# Patient Record
Sex: FEMALE | Race: WHITE | NOT HISPANIC OR LATINO | URBAN - METROPOLITAN AREA
[De-identification: names, ages, dates, MRNs, and addresses within clinical notes are randomized per-mention and may not be internally consistent; named-entity substitution may affect disease eponyms.]

---

## 2023-05-24 ENCOUNTER — OFFICE VISIT (OUTPATIENT)
Dept: PEDIATRICS | Facility: CLINIC | Age: 23
End: 2023-05-24
Payer: COMMERCIAL

## 2023-05-24 VITALS
HEIGHT: 61 IN | BODY MASS INDEX: 29.38 KG/M2 | SYSTOLIC BLOOD PRESSURE: 107 MMHG | WEIGHT: 155.6 LBS | DIASTOLIC BLOOD PRESSURE: 76 MMHG | HEART RATE: 78 BPM

## 2023-05-24 DIAGNOSIS — Z00.00 HEALTH CARE MAINTENANCE: Primary | ICD-10-CM

## 2023-05-24 PROBLEM — L70.0 ACNE VULGARIS: Status: ACTIVE | Noted: 2023-05-24

## 2023-05-24 PROBLEM — M79.604 PAIN IN BOTH LOWER EXTREMITIES: Status: ACTIVE | Noted: 2023-05-24

## 2023-05-24 PROBLEM — K59.00 CONSTIPATION: Status: ACTIVE | Noted: 2023-05-24

## 2023-05-24 PROBLEM — J30.9 ALLERGIC RHINITIS: Status: ACTIVE | Noted: 2023-05-24

## 2023-05-24 PROBLEM — M25.561 ARTHRALGIA OF RIGHT KNEE: Status: RESOLVED | Noted: 2023-05-24 | Resolved: 2023-05-24

## 2023-05-24 PROBLEM — J34.89 NASAL OBSTRUCTION: Status: ACTIVE | Noted: 2023-05-24

## 2023-05-24 PROBLEM — R51.9 FRONTAL HEADACHE: Status: ACTIVE | Noted: 2023-05-24

## 2023-05-24 PROBLEM — R10.9 ABDOMINAL PAIN, ACUTE: Status: ACTIVE | Noted: 2023-05-24

## 2023-05-24 PROBLEM — E61.1 LOW IRON: Status: ACTIVE | Noted: 2023-05-24

## 2023-05-24 PROBLEM — R10.9 ABDOMINAL CRAMPING: Status: ACTIVE | Noted: 2023-05-24

## 2023-05-24 PROBLEM — H52.203 ASTIGMATISM, BILATERAL: Status: ACTIVE | Noted: 2023-05-24

## 2023-05-24 PROBLEM — J30.9 ALLERGIC RHINITIS: Status: RESOLVED | Noted: 2023-05-24 | Resolved: 2023-05-24

## 2023-05-24 PROBLEM — M25.561 ARTHRALGIA OF RIGHT KNEE: Status: ACTIVE | Noted: 2023-05-24

## 2023-05-24 PROBLEM — R53.83 FATIGUE: Status: ACTIVE | Noted: 2023-05-24

## 2023-05-24 PROBLEM — M79.605 PAIN IN BOTH LOWER EXTREMITIES: Status: ACTIVE | Noted: 2023-05-24

## 2023-05-24 PROBLEM — L70.0 ACNE VULGARIS: Status: RESOLVED | Noted: 2023-05-24 | Resolved: 2023-05-24

## 2023-05-24 PROBLEM — M54.9 BACK PAIN: Status: ACTIVE | Noted: 2023-05-24

## 2023-05-24 PROBLEM — A04.72 C. DIFFICILE DIARRHEA: Status: ACTIVE | Noted: 2023-05-24

## 2023-05-24 PROBLEM — B96.81 H. PYLORI DUODENITIS: Status: ACTIVE | Noted: 2023-05-24

## 2023-05-24 PROBLEM — R10.9 ABDOMINAL PAIN, ACUTE: Status: RESOLVED | Noted: 2023-05-24 | Resolved: 2023-05-24

## 2023-05-24 PROBLEM — K29.80 H. PYLORI DUODENITIS: Status: ACTIVE | Noted: 2023-05-24

## 2023-05-24 PROBLEM — K21.9 ESOPHAGEAL REFLUX: Status: RESOLVED | Noted: 2023-05-24 | Resolved: 2023-05-24

## 2023-05-24 PROBLEM — A04.72 C. DIFFICILE DIARRHEA: Status: RESOLVED | Noted: 2023-05-24 | Resolved: 2023-05-24

## 2023-05-24 PROBLEM — M25.50 ARTHRALGIA: Status: ACTIVE | Noted: 2023-05-24

## 2023-05-24 PROBLEM — K58.9 IBS (IRRITABLE BOWEL SYNDROME): Status: ACTIVE | Noted: 2023-05-24

## 2023-05-24 PROBLEM — K21.9 ESOPHAGEAL REFLUX: Status: ACTIVE | Noted: 2023-05-24

## 2023-05-24 PROBLEM — H10.13 ALLERGIC CONJUNCTIVITIS OF BOTH EYES: Status: ACTIVE | Noted: 2023-05-24

## 2023-05-24 PROBLEM — M25.50 ARTHRALGIA: Status: RESOLVED | Noted: 2023-05-24 | Resolved: 2023-05-24

## 2023-05-24 LAB
NON-UH HIE A/G RATIO: 1.2
NON-UH HIE ALB: 3.8 G/DL (ref 3.4–5)
NON-UH HIE ALK PHOS: 61 UNIT/L (ref 46–116)
NON-UH HIE BILIRUBIN, TOTAL: 0.6 MG/DL (ref 0.2–1)
NON-UH HIE BUN/CREAT RATIO: 17.1
NON-UH HIE BUN: 12 MG/DL (ref 9–23)
NON-UH HIE CALCIUM: 9.3 MG/DL (ref 8.7–10.4)
NON-UH HIE CALCULATED LDL CHOLESTEROL: 88 MG/DL (ref 60–130)
NON-UH HIE CALCULATED OSMOLALITY: 278 MOSM/KG (ref 275–295)
NON-UH HIE CHLORIDE: 108 MMOL/L (ref 98–107)
NON-UH HIE CHOLESTEROL: 177 MG/DL (ref 100–200)
NON-UH HIE CO2, VENOUS: 27 MMOL/L (ref 20–31)
NON-UH HIE CREATININE: 0.7 MG/DL (ref 0.5–0.8)
NON-UH HIE GFR AA: >60
NON-UH HIE GLOBULIN: 3.3 G/DL
NON-UH HIE GLOMERULAR FILTRATION RATE: >60 ML/MIN/1.73M?
NON-UH HIE GLUCOSE: 85 MG/DL (ref 74–106)
NON-UH HIE GOT: 20 UNIT/L (ref 15–37)
NON-UH HIE GPT: 10 UNIT/L (ref 10–49)
NON-UH HIE HCT: 38.6 % (ref 36–46)
NON-UH HIE HDL CHOLESTEROL: 79 MG/DL (ref 40–60)
NON-UH HIE HEPATITIS B SURFACE ANTIBODY: REACTIVE
NON-UH HIE HGB: 12.7 G/DL (ref 12–16)
NON-UH HIE INSTR WBC ND: 3.5
NON-UH HIE K: 4.1 MMOL/L (ref 3.5–5.1)
NON-UH HIE MCH: 27.6 PG (ref 27–34)
NON-UH HIE MCHC: 33 G/DL (ref 32–37)
NON-UH HIE MCV: 83.8 FL (ref 80–100)
NON-UH HIE MPV: 9.7 FL (ref 7.4–10.4)
NON-UH HIE NA: 140 MMOL/L (ref 135–145)
NON-UH HIE PLATELET: 223 X10 (ref 150–450)
NON-UH HIE RBC: 4.6 X10 (ref 4.2–5.4)
NON-UH HIE RDW: 14.3 % (ref 11.5–14.5)
NON-UH HIE TOTAL CHOL/HDL CHOL RATIO: 2.2
NON-UH HIE TOTAL PROTEIN: 7.1 G/DL (ref 5.7–8.2)
NON-UH HIE TRIGLYCERIDES: 49 MG/DL (ref 30–150)
NON-UH HIE TSH: 1.53 UIU/ML (ref 0.55–4.78)
NON-UH HIE WBC: 3.5 X10 (ref 4.5–11)

## 2023-05-24 PROCEDURE — 99395 PREV VISIT EST AGE 18-39: CPT | Performed by: PEDIATRICS

## 2023-05-24 PROCEDURE — 1036F TOBACCO NON-USER: CPT | Performed by: PEDIATRICS

## 2023-05-24 PROCEDURE — 3008F BODY MASS INDEX DOCD: CPT | Performed by: PEDIATRICS

## 2023-05-24 NOTE — PATIENT INSTRUCTIONS
It looks like your school is only requesting titers for hep B  which we ordered today    We are also ordering a varicella titer bc we can only find one documented dose     We are doing routine lab works as well    We will decide based on titers what vaccines are needed along with Hep A #2

## 2023-05-24 NOTE — PROGRESS NOTES
"   Suman Gerber is a 22 y.o. female who presents for Well Child (22 year [facility]/Here by herself).      HPI  starting pharmacy school   end of Auguts  will live at home  NEOMED    Needs copies of vaccines    Titers for Hep B  and will do Varicella bc only one documented dose   Requesting labs  still some feeling dizzy and sometimes takes Bps and systolic is 90s       Stomach better     all groups     water  good   yogurt    Sleep   good schedule    Headaches have been better     Seasonal   allergies    Not in relationship   No constipation   Periods regular     Working  this summer   [employer]   pharmacy  dept         Living   home                   Objective   /76 (BP Location: Left arm, Patient Position: Sitting)   Pulse 78   Ht 1.549 m (5' 1\")   Wt 70.6 kg (155 lb 9.6 oz) Comment: 155.6 lbs  BMI 29.40 kg/m²       Physical Exam  General: Well-developed, well-nourished, alert and oriented, no acute distress  Eyes: Normal sclera, ERASTO, EOMI. Red reflex intact, light reflex symmetric.   ENT: Moist mucous membranes, normal throat, no nasal discharge. TMs are normal.  Cardiac:  Normal S1/S2, regular rhythm. Capillary refill less than 2 seconds. No clinically significant murmurs.    Pulmonary: Clear to auscultation bilaterally, no work of breathing.  GI: Soft nontender nondistended abdomen, no HSM, no masses.    Skin: No specific or unusual rashes  Neuro: Symmetric face, no ataxia, grossly normal strength and normal reflexes.  Lymph and Neck: No lymphadenopathy, no visible thyroid swelling.  Musculoskeletal:   Full  range of motion, normal strength and tone, no significant scoliosis,  no joint swelling or bone tenderness  Psych:  normal mood and affect  :  not examined       Assessment/Plan   Problem List Items Addressed This Visit    None  Visit Diagnoses       Health care maintenance    -  Primary    Relevant Orders    Varicella Zoster Antibody, IgG    Hepatitis B Surface Antibody    CBC    Comprehensive Metabolic " Panel    Vitamin D 1,25 Dihydroxy    Lipid panel    TSH with reflex to Free T4 if abnormal    BMI 29.0-29.9,adult                Patient Instructions     It looks like your school is only requesting titers for hep B  which we ordered today    We are also ordering a varicella titer bc we can only find one documented dose     We are doing routine lab works as well    We will decide based on titers what vaccines are needed along with Hep A #2

## 2023-05-25 LAB — NON-UH HIE V. ZOSTER ANTIBODY: NORMAL

## 2023-05-28 LAB — NON-UH HIE MISC SENDOUT: NORMAL

## 2023-08-02 ENCOUNTER — APPOINTMENT (OUTPATIENT)
Dept: PEDIATRICS | Facility: CLINIC | Age: 23
End: 2023-08-02
Payer: COMMERCIAL

## 2023-08-08 ENCOUNTER — CLINICAL SUPPORT (OUTPATIENT)
Dept: PEDIATRICS | Facility: CLINIC | Age: 23
End: 2023-08-08
Payer: COMMERCIAL

## 2023-08-08 DIAGNOSIS — Z11.1 PPD SCREENING TEST: Primary | ICD-10-CM

## 2023-08-08 PROCEDURE — 86580 TB INTRADERMAL TEST: CPT | Performed by: PEDIATRICS

## 2023-08-10 ENCOUNTER — OFFICE VISIT (OUTPATIENT)
Dept: PEDIATRICS | Facility: CLINIC | Age: 23
End: 2023-08-10
Payer: COMMERCIAL

## 2023-08-10 DIAGNOSIS — Z11.1 PPD SCREENING TEST: ICD-10-CM

## 2023-08-10 DIAGNOSIS — Z00.129 ENCOUNTER FOR ROUTINE CHILD HEALTH EXAMINATION WITHOUT ABNORMAL FINDINGS: Primary | ICD-10-CM

## 2023-08-10 PROCEDURE — 99212 OFFICE O/P EST SF 10 MIN: CPT | Performed by: PEDIATRICS

## 2023-08-10 PROCEDURE — 1036F TOBACCO NON-USER: CPT | Performed by: PEDIATRICS

## 2023-08-10 PROCEDURE — 3008F BODY MASS INDEX DOCD: CPT | Performed by: PEDIATRICS

## 2023-08-10 NOTE — PROGRESS NOTES
TB    with  2 mm  induration      Negative read      In looking at her screenshots  she actually  needed a TB spot and the skin test is not acceptable so we gave an order today    We also gave her copies of her Varicella and Hep B tires which both demonstrate that the is immune/ reactive a nd needs no further immunization

## 2023-08-13 LAB
NON-UH HIE QUANTIFERON MITOGEN MINUS NIL: >10 IU/ML
NON-UH HIE QUANTIFERON NIL: 0.05 IU/ML
NON-UH HIE QUANTIFERON PLUS TB1 MINUS NIL: 0 IU/ML (ref 0–0.34)
NON-UH HIE QUANTIFERON PLUS TB2 MINUS NIL: 0 IU/ML (ref 0–0.34)
NON-UH HIE QUANTIFERON TB GOLD PLUS: NEGATIVE

## 2023-08-16 ENCOUNTER — TELEPHONE (OUTPATIENT)
Dept: PEDIATRICS | Facility: CLINIC | Age: 23
End: 2023-08-16
Payer: COMMERCIAL

## 2023-08-16 NOTE — TELEPHONE ENCOUNTER
----- Message from DERIK Steiner sent at 8/16/2023  8:37 AM EDT -----  Let her know the TB blood test was normal. Hopefully gina can see it in my chart or we can email it to her   ----- Message -----  From: Lewis Lott - Lab Results In  Sent: 8/13/2023   5:01 PM EDT  To: DERIK Steiner

## 2023-11-22 ENCOUNTER — OFFICE VISIT (OUTPATIENT)
Dept: URGENT CARE | Facility: CLINIC | Age: 23
End: 2023-11-22
Payer: COMMERCIAL

## 2023-11-22 VITALS
HEART RATE: 109 BPM | SYSTOLIC BLOOD PRESSURE: 107 MMHG | RESPIRATION RATE: 18 BRPM | TEMPERATURE: 97.9 F | WEIGHT: 152 LBS | OXYGEN SATURATION: 99 % | DIASTOLIC BLOOD PRESSURE: 73 MMHG | BODY MASS INDEX: 28.72 KG/M2

## 2023-11-22 DIAGNOSIS — H66.002 ACUTE SUPPURATIVE OTITIS MEDIA OF LEFT EAR WITHOUT SPONTANEOUS RUPTURE OF TYMPANIC MEMBRANE, RECURRENCE NOT SPECIFIED: Primary | ICD-10-CM

## 2023-11-22 PROCEDURE — 99203 OFFICE O/P NEW LOW 30 MIN: CPT | Performed by: FAMILY MEDICINE

## 2023-11-22 PROCEDURE — 1036F TOBACCO NON-USER: CPT | Performed by: FAMILY MEDICINE

## 2023-11-22 PROCEDURE — 3008F BODY MASS INDEX DOCD: CPT | Performed by: FAMILY MEDICINE

## 2023-11-22 NOTE — PROGRESS NOTES
Complains of three weeks congestion facial pressure, green sputum and nasal drainage    Subjective   Suman Gerber is a 23 y.o. female who presents for evaluation of possible sinus infection. Symptoms include congestion and nasal congestion with no fever, chills, night sweats or weight loss. Onset of symptoms was 3 weeks ago, gradually worsening since that time. She is drinking plenty of fluids. Past history is significant for no history of pneumonia or bronchitis. Patient is a non-smoker.    Objective   /73   Pulse 109   Temp 36.6 °C (97.9 °F)   Resp 18   Wt 68.9 kg (152 lb)   SpO2 99%   BMI 28.72 kg/m²   Physical Exam  Alert and oriented no distress  Heent Purulent nasal drainage, ears normal, throat normal, nolymphadenopathy  Heart exam normal  Lung - CTA    Assessment/Plan   Acute bacterial sinusitis.    1. rest  2. Augmentin  3. Nasal saline rinses as needed for congestion.  4. Follow-up with PCP in 7 days if symptoms worsen or persist.    Augmetin 875mg   #20   1 po bid  called to her pharmacy

## 2024-01-03 ENCOUNTER — OFFICE VISIT (OUTPATIENT)
Dept: PRIMARY CARE | Facility: CLINIC | Age: 24
End: 2024-01-03
Payer: COMMERCIAL

## 2024-01-03 VITALS
DIASTOLIC BLOOD PRESSURE: 78 MMHG | HEART RATE: 89 BPM | HEIGHT: 61 IN | OXYGEN SATURATION: 98 % | WEIGHT: 171 LBS | SYSTOLIC BLOOD PRESSURE: 113 MMHG | BODY MASS INDEX: 32.28 KG/M2

## 2024-01-03 DIAGNOSIS — K12.0 APHTHOUS ULCER: ICD-10-CM

## 2024-01-03 DIAGNOSIS — R03.0 ELEVATED BP WITHOUT DIAGNOSIS OF HYPERTENSION: ICD-10-CM

## 2024-01-03 DIAGNOSIS — B37.9 CANDIDA INFECTION: ICD-10-CM

## 2024-01-03 DIAGNOSIS — R00.2 PALPITATIONS: Primary | ICD-10-CM

## 2024-01-03 PROCEDURE — 99213 OFFICE O/P EST LOW 20 MIN: CPT | Performed by: FAMILY MEDICINE

## 2024-01-03 PROCEDURE — 1036F TOBACCO NON-USER: CPT | Performed by: FAMILY MEDICINE

## 2024-01-03 PROCEDURE — 3008F BODY MASS INDEX DOCD: CPT | Performed by: FAMILY MEDICINE

## 2024-01-03 RX ORDER — FLUCONAZOLE 150 MG/1
150 TABLET ORAL ONCE
Qty: 1 TABLET | Refills: 0 | Status: SHIPPED | OUTPATIENT
Start: 2024-01-03 | End: 2024-01-03

## 2024-01-03 RX ORDER — LIDOCAINE HYDROCHLORIDE 20 MG/ML
1.25 SOLUTION OROPHARYNGEAL AS NEEDED
Qty: 20 ML | Refills: 0 | Status: SHIPPED | OUTPATIENT
Start: 2024-01-03

## 2024-01-03 RX ORDER — VALACYCLOVIR HYDROCHLORIDE 1 G/1
2000 TABLET, FILM COATED ORAL 2 TIMES DAILY
Qty: 4 TABLET | Refills: 0 | Status: SHIPPED | OUTPATIENT
Start: 2024-01-03

## 2024-01-03 ASSESSMENT — PATIENT HEALTH QUESTIONNAIRE - PHQ9
SUM OF ALL RESPONSES TO PHQ9 QUESTIONS 1 AND 2: 0
10. IF YOU CHECKED OFF ANY PROBLEMS, HOW DIFFICULT HAVE THESE PROBLEMS MADE IT FOR YOU TO DO YOUR WORK, TAKE CARE OF THINGS AT HOME, OR GET ALONG WITH OTHER PEOPLE: NOT DIFFICULT AT ALL
2. FEELING DOWN, DEPRESSED OR HOPELESS: NOT AT ALL
8. MOVING OR SPEAKING SO SLOWLY THAT OTHER PEOPLE COULD HAVE NOTICED. OR THE OPPOSITE, BEING SO FIGETY OR RESTLESS THAT YOU HAVE BEEN MOVING AROUND A LOT MORE THAN USUAL: NOT AT ALL
4. FEELING TIRED OR HAVING LITTLE ENERGY: SEVERAL DAYS
6. FEELING BAD ABOUT YOURSELF - OR THAT YOU ARE A FAILURE OR HAVE LET YOURSELF OR YOUR FAMILY DOWN: NOT AT ALL
SUM OF ALL RESPONSES TO PHQ QUESTIONS 1-9: 3
5. POOR APPETITE OR OVEREATING: NOT AT ALL
9. THOUGHTS THAT YOU WOULD BE BETTER OFF DEAD, OR OF HURTING YOURSELF: NOT AT ALL
1. LITTLE INTEREST OR PLEASURE IN DOING THINGS: NOT AT ALL
7. TROUBLE CONCENTRATING ON THINGS, SUCH AS READING THE NEWSPAPER OR WATCHING TELEVISION: SEVERAL DAYS
3. TROUBLE FALLING OR STAYING ASLEEP OR SLEEPING TOO MUCH: SEVERAL DAYS

## 2024-01-03 NOTE — PROGRESS NOTES
Subjective   Patient ID: Suman Gerber is a 23 y.o. female [id number] who presents for Establish Care (New patient, sore in mouth).    HPI   Patient is here for concern for aphthous ulcer for last few days.  Patient usually get Valtrex with improvement of symptoms.  Asking for the same.    Patient is noticing palpitation and chest tightness at night.  Noticing elevated blood pressure at night affecting her sleep.  Patient denies feeling anxious or nervous sad or depressed.  I went her routine blood work recently.  Would like to get 1.    Recently completed antibiotic. Noticing yeast infection      Immunization History   Administered Date(s) Administered    DTaP vaccine, pediatric  (INFANRIX) 01/05/2000, 03/07/2000, 05/30/2000, 05/04/2001, 03/11/2005    Flu vaccine (IIV4), preservative free *Check age/dose* 10/01/2019, 01/15/2021, 10/24/2022    HPV, Quadrivalent 08/26/2011, 01/13/2012, 05/18/2012    Hepatitis A vaccine, age 19 years and greater (HAVRIX) 01/15/2021    Hepatitis B vaccine, pediatric/adolescent (RECOMBIVAX, ENGERIX) 04/16/2019, 01/15/2021    Hib / Hep B 01/05/2000, 03/07/2000, 03/12/2001    Influenza Nasal, Unspecified 01/20/2012    Influenza, Unspecified 01/20/2012    Influenza, injectable, quadrivalent 04/11/2019, 10/28/2021    MMR vaccine, subcutaneous (MMR II) 01/12/2001, 03/11/2005, 04/16/2019    Meningococcal MCV4P 05/18/2012, 05/18/2016    Novel influenza-H1N1-09, preservative-free 12/17/2009    PPD Test 04/09/2019, 04/16/2019, 08/08/2023    Pfizer Purple Cap SARS-CoV-2 02/25/2021, 04/10/2021    Pneumococcal Conjugate PCV 7 01/12/2001, 03/12/2001, 08/16/2003    Poliovirus vaccine, subcutaneous (IPOL) 01/05/2000, 03/07/2000, 05/04/2001, 03/11/2005    Tdap vaccine, age 7 year and older (BOOSTRIX) 08/26/2011, 01/15/2021    Varicella vaccine, subcutaneous (VARIVAX) 01/12/2001       Past Medical History:   Diagnosis Date    Body mass index (BMI) pediatric, 85th percentile to less than 95th percentile for  "age 2019    BMI (body mass index), pediatric, 85% to less than 95% for age    Hypermetropia, unspecified eye 2019    Hyperopia    Nausea 2020    Nausea in adult    Personal history of other diseases of the digestive system 2020    History of gastritis    Personal history of other diseases of the respiratory system 2020    History of acute sinusitis    Personal history of other specified conditions 2020    History of nausea and vomiting    Personal history of other specified conditions 2019    History of fatigue    Personal history of other specified conditions 2020    History of diarrhea    Personal history of other specified conditions 2019    History of diarrhea     , unspecified weeks of gestation     Premature infant    Unspecified otitis externa, right ear 2017    Right otitis externa       Social History     Tobacco Use    Smoking status: Never    Smokeless tobacco: Never   Vaping Use    Vaping Use: Never used   Substance Use Topics    Alcohol use: Never    Drug use: Never       No family history on file.    Recent Surgeries in Family Medicine            No cases to display            Current Outpatient Medications   Medication Sig Dispense Refill    fluconazole (Diflucan) 150 mg tablet Take 1 tablet (150 mg) by mouth 1 time for 1 dose. 1 tablet 0     No current facility-administered medications for this visit.       Physical Exam  /78   Pulse 89   Ht 1.549 m (5' 1\")   Wt 77.6 kg (171 lb)   SpO2 98%   BMI 32.31 kg/m²     Allergies   Allergen Reactions    Sulfamethoxazole-Trimethoprim Unknown and Rash     muscle pain    Pt states itching and chest started to hurt     Other Reaction(s): Unknown, Unknown      Pt states itching and chest started to hurt       muscle pain       General Appearance:  Alert, cooperative, no distress,   Head:  Normocephalic, atraumatic   Eyes:  PERRL, conjunctiva/corneas clear, EOM's intact,    Lungs:  "  Clear to auscultation bilaterally, respirations unlabored   Heart:  Regular rate and rhythm, S1 and S2 normal, no murmur,    Abdomen:   Soft, non-tender, bowel sounds active all four quadrants,  no masses, no organomegaly   Extremities: Extremities normal, No edema   Neurologic: Normal        Assessment/Plan   Diagnoses and all orders for this visit:  Palpitations  -     Holter Monitor >48 Hours - 7 Days - No Charges; Future  -     Comprehensive Metabolic Panel; Future  -     TSH with reflex to Free T4 if abnormal; Future  -     Triiodothyronine, Free; Future  -     Magnesium; Future  -     Vitamin B12; Future  Elevated BP without diagnosis of hypertension  -     Holter Monitor >48 Hours - 7 Days - No Charges; Future  -     CBC and Auto Differential; Future  Aphthous ulcer  -     lidocaine (Xylocaine) 2 % solution; Take 1.25 mL by mouth if needed for mild pain (1 - 3).  -     valACYclovir (Valtrex) 1 gram tablet; Take 2 tablets (2,000 mg) by mouth 2 times a day.  Candida infection  -     fluconazole (Diflucan) 150 mg tablet; Take 1 tablet (150 mg) by mouth 1 time for 1 dose.

## 2024-01-04 ENCOUNTER — TELEPHONE (OUTPATIENT)
Dept: PRIMARY CARE | Facility: CLINIC | Age: 24
End: 2024-01-04
Payer: COMMERCIAL

## 2024-06-17 ENCOUNTER — APPOINTMENT (OUTPATIENT)
Dept: PRIMARY CARE | Facility: CLINIC | Age: 24
End: 2024-06-17
Payer: COMMERCIAL

## 2024-06-18 ENCOUNTER — APPOINTMENT (OUTPATIENT)
Dept: PRIMARY CARE | Facility: CLINIC | Age: 24
End: 2024-06-18
Payer: COMMERCIAL

## 2024-06-25 ENCOUNTER — OFFICE VISIT (OUTPATIENT)
Dept: PRIMARY CARE | Facility: CLINIC | Age: 24
End: 2024-06-25
Payer: COMMERCIAL

## 2024-06-25 ENCOUNTER — LAB (OUTPATIENT)
Dept: LAB | Facility: LAB | Age: 24
End: 2024-06-25
Payer: COMMERCIAL

## 2024-06-25 VITALS
BODY MASS INDEX: 32.93 KG/M2 | DIASTOLIC BLOOD PRESSURE: 72 MMHG | HEIGHT: 61 IN | WEIGHT: 174.4 LBS | SYSTOLIC BLOOD PRESSURE: 108 MMHG | HEART RATE: 74 BPM | OXYGEN SATURATION: 98 %

## 2024-06-25 DIAGNOSIS — Z13.21 ENCOUNTER FOR VITAMIN DEFICIENCY SCREENING: ICD-10-CM

## 2024-06-25 DIAGNOSIS — R79.0 LOW SERUM FERRITIN LEVEL: ICD-10-CM

## 2024-06-25 DIAGNOSIS — Z86.19 HISTORY OF COLD SORES: ICD-10-CM

## 2024-06-25 DIAGNOSIS — R42 LIGHTHEADEDNESS: ICD-10-CM

## 2024-06-25 DIAGNOSIS — R79.0 LOW SERUM FERRITIN LEVEL: Primary | ICD-10-CM

## 2024-06-25 DIAGNOSIS — R53.83 OTHER FATIGUE: ICD-10-CM

## 2024-06-25 DIAGNOSIS — B35.1 FUNGAL NAIL INFECTION: ICD-10-CM

## 2024-06-25 PROBLEM — H66.009 ACUTE SUPPURATIVE OTITIS MEDIA WITHOUT SPONTANEOUS RUPTURE OF EAR DRUM: Status: ACTIVE | Noted: 2024-06-25

## 2024-06-25 PROBLEM — R07.89 ATYPICAL CHEST PAIN: Status: ACTIVE | Noted: 2023-08-23

## 2024-06-25 PROBLEM — R09.81 NASAL CONGESTION: Status: ACTIVE | Noted: 2023-08-23

## 2024-06-25 PROBLEM — M79.606 PAIN OF LOWER EXTREMITY: Status: ACTIVE | Noted: 2024-06-25

## 2024-06-25 PROBLEM — R06.09 DYSPNEA ON EXERTION: Status: ACTIVE | Noted: 2023-08-23

## 2024-06-25 PROBLEM — E66.3 PEDIATRIC OVERWEIGHT: Status: ACTIVE | Noted: 2024-06-25

## 2024-06-25 PROBLEM — K25.9: Status: ACTIVE | Noted: 2023-08-23

## 2024-06-25 PROBLEM — B37.9 YEAST INFECTION: Status: ACTIVE | Noted: 2023-12-16

## 2024-06-25 PROBLEM — H52.00 HYPEROPIA: Status: ACTIVE | Noted: 2023-08-23

## 2024-06-25 PROBLEM — Z77.29 CARBON MONOXIDE EXPOSURE: Status: ACTIVE | Noted: 2020-07-09

## 2024-06-25 PROBLEM — R06.89 DIFFICULTY BREATHING: Status: ACTIVE | Noted: 2023-08-23

## 2024-06-25 PROBLEM — R11.2 NAUSEA AND VOMITING: Status: ACTIVE | Noted: 2023-08-23

## 2024-06-25 PROBLEM — B00.9 HERPES SIMPLEX VIRUS (HSV) INFECTION: Status: ACTIVE | Noted: 2024-06-25

## 2024-06-25 PROBLEM — J18.9 COMMUNITY ACQUIRED PNEUMONIA: Status: ACTIVE | Noted: 2020-01-01

## 2024-06-25 PROBLEM — R39.9 SYMPTOMS INVOLVING URINARY SYSTEM: Status: ACTIVE | Noted: 2024-06-25

## 2024-06-25 PROBLEM — J32.4 CHRONIC PANSINUSITIS: Status: ACTIVE | Noted: 2023-10-20

## 2024-06-25 PROBLEM — L60.3 NAIL DYSTROPHY: Status: ACTIVE | Noted: 2020-10-12

## 2024-06-25 PROBLEM — N39.0 URINARY TRACT INFECTION: Status: ACTIVE | Noted: 2023-12-16

## 2024-06-25 PROBLEM — E61.1 IRON DEFICIENCY: Status: ACTIVE | Noted: 2024-02-22

## 2024-06-25 PROBLEM — H60.90 OTITIS EXTERNA: Status: ACTIVE | Noted: 2024-06-25

## 2024-06-25 PROBLEM — H10.10 ALLERGIC CONJUNCTIVITIS: Status: ACTIVE | Noted: 2023-05-24

## 2024-06-25 PROBLEM — R19.7 DIARRHEA: Status: ACTIVE | Noted: 2024-06-25

## 2024-06-25 PROBLEM — L85.8 OTHER SPECIFIED EPIDERMAL THICKENING: Status: ACTIVE | Noted: 2020-10-12

## 2024-06-25 PROBLEM — J01.41 ACUTE RECURRENT PANSINUSITIS: Status: ACTIVE | Noted: 2023-10-20

## 2024-06-25 PROCEDURE — 80053 COMPREHEN METABOLIC PANEL: CPT

## 2024-06-25 PROCEDURE — 82728 ASSAY OF FERRITIN: CPT

## 2024-06-25 PROCEDURE — 83540 ASSAY OF IRON: CPT

## 2024-06-25 PROCEDURE — 86695 HERPES SIMPLEX TYPE 1 TEST: CPT

## 2024-06-25 PROCEDURE — 85025 COMPLETE CBC W/AUTO DIFF WBC: CPT

## 2024-06-25 PROCEDURE — 82306 VITAMIN D 25 HYDROXY: CPT

## 2024-06-25 PROCEDURE — 86696 HERPES SIMPLEX TYPE 2 TEST: CPT

## 2024-06-25 PROCEDURE — 83550 IRON BINDING TEST: CPT

## 2024-06-25 PROCEDURE — 1036F TOBACCO NON-USER: CPT | Performed by: NURSE PRACTITIONER

## 2024-06-25 PROCEDURE — 36415 COLL VENOUS BLD VENIPUNCTURE: CPT

## 2024-06-25 PROCEDURE — 99214 OFFICE O/P EST MOD 30 MIN: CPT | Performed by: NURSE PRACTITIONER

## 2024-06-25 PROCEDURE — 84443 ASSAY THYROID STIM HORMONE: CPT

## 2024-06-25 PROCEDURE — 82607 VITAMIN B-12: CPT

## 2024-06-25 ASSESSMENT — ENCOUNTER SYMPTOMS
HEADACHES: 1
SLEEP DISTURBANCE: 0
CARDIOVASCULAR NEGATIVE: 1
RESPIRATORY NEGATIVE: 1
CONSTITUTIONAL NEGATIVE: 1

## 2024-06-25 NOTE — PROGRESS NOTES
"Subjective   Patient ID: Suman Gerber is a 23 y.o. female who presents for Establish Care (New pt here to get est care. She does plan on staying with [facility]. She didn't want to stay with the last pcp due to it took so long to get back in to see her. /Previous PCP Dr Cipriano Magaña ([facility]) LOV 2/15/24 for SOB. /Pt would like an order for labs. Per pt she said usually her iron runs low and she would like to have it checked. She was not aware her last pcp put any labs in for her to have completed. ).    HPI   Patient here to establish requesting lab orders. Previous provider Dr Cipriano Magaña at [facility] 02/2024.  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   Current Concerns:    1) low vitamin B12 and iron - helps to take when she is on her period. Dizzy and lightheaded at times throughout the day.  Typically no breakfast, healthy lunch, drinking water throughout the day. No soda.  Anxiety has improved.   2) left foot toes nails have changed after gel-based nail polish removed approximately 3 weeks ago. Had gone to [facility] for pedicure.  No concerns with right foot toe nails.   Chronic concerns: HSV Low iron stores, IBS,   Specialist- unknown   Labs- none recently   Walking for exercise     Review of Systems   Constitutional: Negative.    Respiratory: Negative.     Cardiovascular: Negative.    Gastrointestinal:         IBS controlled with diet and exercise.    Genitourinary:         Menses 06/08/2024, 7 days, cramping prior to period    Neurological:  Positive for headaches (mild headaches- 3 x week tylenol).   Psychiatric/Behavioral:  Negative for sleep disturbance.      Objective   /72 (BP Location: Right arm, Patient Position: Sitting, BP Cuff Size: Adult)   Pulse 74   Ht 1.549 m (5' 1\")   Wt 79.1 kg (174 lb 6.4 oz)   SpO2 98%   BMI 32.95 kg/m²   Weight is stable     Physical Exam  Vitals reviewed.   Constitutional:       Appearance: She is obese.   HENT:      Mouth/Throat:      Mouth: Mucous membranes " are moist.   Eyes:      General: Lids are normal.      Conjunctiva/sclera: Conjunctivae normal.   Neck:      Thyroid: No thyromegaly.      Vascular: No carotid bruit.   Cardiovascular:      Rate and Rhythm: Normal rate and regular rhythm.      Heart sounds: Normal heart sounds.   Pulmonary:      Effort: Pulmonary effort is normal.      Breath sounds: Normal breath sounds. No decreased breath sounds, wheezing or rhonchi.   Musculoskeletal:      Cervical back: Neck supple.      Right lower leg: No edema.      Left lower leg: No edema.   Feet:      Right foot:      Skin integrity: Skin integrity normal.      Toenail Condition: Right toenails are normal.      Left foot:      Skin integrity: Skin integrity normal.      Toenail Condition: Left toenails are long.      Comments: left toe nails have pale brittle appearance   Lymphadenopathy:      Cervical: No cervical adenopathy.   Neurological:      Mental Status: She is alert.      Gait: Gait is intact.   Psychiatric:         Attention and Perception: Attention normal.         Behavior: Behavior normal. Behavior is cooperative.       Assessment/Plan   Diagnoses and all orders for this visit:  Low serum ferritin level    -     Iron and TIBC; Future  -     Ferritin; Future  Lightheadedness / Other fatigue  -     CBC and Auto Differential; Future  -     Comprehensive Metabolic Panel; Future  -     TSH with reflex to Free T4 if abnormal; Future  -     Iron and TIBC; Future  Encounter for vitamin deficiency screening  -     Vitamin D 25-Hydroxy,Total (for eval of Vitamin D levels); Future  -     Vitamin B12; Future  Fungal nail infection- agreed to start on Terbinafine 250 mg every day 12 weeks once liver enzymes reviewed  -     Referral to Podiatry; Future  History of cold sores  -     HSV1 IgG and HSV2 IgG; Future    Plan: follow up pending labs  Once liver enzymes reviewed send Terbinafine 250 mg every day 12 weeks- podiatry referral for further evaluation

## 2024-06-26 LAB
25(OH)D3 SERPL-MCNC: 16 NG/ML (ref 30–100)
ALBUMIN SERPL BCP-MCNC: 4.7 G/DL (ref 3.4–5)
ALP SERPL-CCNC: 52 U/L (ref 33–110)
ALT SERPL W P-5'-P-CCNC: 11 U/L (ref 7–45)
ANION GAP SERPL CALC-SCNC: 14 MMOL/L (ref 10–20)
AST SERPL W P-5'-P-CCNC: 18 U/L (ref 9–39)
BASOPHILS # BLD AUTO: 0.03 X10*3/UL (ref 0–0.1)
BASOPHILS NFR BLD AUTO: 0.5 %
BILIRUB SERPL-MCNC: 0.7 MG/DL (ref 0–1.2)
BUN SERPL-MCNC: 9 MG/DL (ref 6–23)
CALCIUM SERPL-MCNC: 9.5 MG/DL (ref 8.6–10.6)
CHLORIDE SERPL-SCNC: 104 MMOL/L (ref 98–107)
CO2 SERPL-SCNC: 23 MMOL/L (ref 21–32)
CREAT SERPL-MCNC: 0.64 MG/DL (ref 0.5–1.05)
EGFRCR SERPLBLD CKD-EPI 2021: >90 ML/MIN/1.73M*2
EOSINOPHIL # BLD AUTO: 0.05 X10*3/UL (ref 0–0.7)
EOSINOPHIL NFR BLD AUTO: 0.9 %
ERYTHROCYTE [DISTWIDTH] IN BLOOD BY AUTOMATED COUNT: 14.3 % (ref 11.5–14.5)
FERRITIN SERPL-MCNC: 29 NG/ML (ref 8–150)
GLUCOSE SERPL-MCNC: 80 MG/DL (ref 74–99)
HCT VFR BLD AUTO: 39.9 % (ref 36–46)
HERPES SIMPLEX VIRUS 1 IGG: 8 INDEX
HERPES SIMPLEX VIRUS 2 IGG: <0.2 INDEX
HGB BLD-MCNC: 12.5 G/DL (ref 12–16)
IMM GRANULOCYTES # BLD AUTO: 0.01 X10*3/UL (ref 0–0.7)
IMM GRANULOCYTES NFR BLD AUTO: 0.2 % (ref 0–0.9)
IRON SATN MFR SERPL: 26 % (ref 25–45)
IRON SERPL-MCNC: 126 UG/DL (ref 35–150)
LYMPHOCYTES # BLD AUTO: 1.94 X10*3/UL (ref 1.2–4.8)
LYMPHOCYTES NFR BLD AUTO: 33.6 %
MCH RBC QN AUTO: 27 PG (ref 26–34)
MCHC RBC AUTO-ENTMCNC: 31.3 G/DL (ref 32–36)
MCV RBC AUTO: 86 FL (ref 80–100)
MONOCYTES # BLD AUTO: 0.46 X10*3/UL (ref 0.1–1)
MONOCYTES NFR BLD AUTO: 8 %
NEUTROPHILS # BLD AUTO: 3.28 X10*3/UL (ref 1.2–7.7)
NEUTROPHILS NFR BLD AUTO: 56.8 %
NRBC BLD-RTO: 0 /100 WBCS (ref 0–0)
PLATELET # BLD AUTO: 243 X10*3/UL (ref 150–450)
POTASSIUM SERPL-SCNC: 4.1 MMOL/L (ref 3.5–5.3)
PROT SERPL-MCNC: 7.3 G/DL (ref 6.4–8.2)
RBC # BLD AUTO: 4.63 X10*6/UL (ref 4–5.2)
SODIUM SERPL-SCNC: 137 MMOL/L (ref 136–145)
TIBC SERPL-MCNC: 478 UG/DL (ref 240–445)
TSH SERPL-ACNC: 1.04 MIU/L (ref 0.44–3.98)
UIBC SERPL-MCNC: 352 UG/DL (ref 110–370)
VIT B12 SERPL-MCNC: 302 PG/ML (ref 211–911)
WBC # BLD AUTO: 5.8 X10*3/UL (ref 4.4–11.3)

## 2024-07-09 DIAGNOSIS — E55.9 VITAMIN D DEFICIENCY: ICD-10-CM

## 2024-07-09 DIAGNOSIS — B35.1 FUNGAL NAIL INFECTION: ICD-10-CM

## 2024-07-09 DIAGNOSIS — B00.9 HSV INFECTION: Primary | ICD-10-CM

## 2024-07-09 RX ORDER — VALACYCLOVIR HYDROCHLORIDE 1 G/1
2000 TABLET, FILM COATED ORAL 2 TIMES DAILY
Qty: 4 TABLET | Refills: 3 | Status: SHIPPED | OUTPATIENT
Start: 2024-07-09 | End: 2024-07-10

## 2024-07-10 RX ORDER — TERBINAFINE HYDROCHLORIDE 250 MG/1
250 TABLET ORAL DAILY
Qty: 84 TABLET | Refills: 0 | Status: SHIPPED | OUTPATIENT
Start: 2024-07-10 | End: 2024-10-02

## 2024-07-10 RX ORDER — ACETAMINOPHEN 500 MG
2000 TABLET ORAL DAILY
Qty: 90 CAPSULE | Refills: 3 | Status: SHIPPED | OUTPATIENT
Start: 2024-07-10

## 2024-09-03 ENCOUNTER — OFFICE VISIT (OUTPATIENT)
Dept: PODIATRY | Facility: CLINIC | Age: 24
End: 2024-09-03
Payer: COMMERCIAL

## 2024-09-03 VITALS — SYSTOLIC BLOOD PRESSURE: 100 MMHG | DIASTOLIC BLOOD PRESSURE: 60 MMHG

## 2024-09-03 DIAGNOSIS — L60.3 NAIL DYSTROPHY: Primary | ICD-10-CM

## 2024-09-03 DIAGNOSIS — M79.675 TOE PAIN, LEFT: ICD-10-CM

## 2024-09-03 PROCEDURE — 11755 BIOPSY NAIL UNIT: CPT | Performed by: PODIATRIST

## 2024-09-03 PROCEDURE — 1036F TOBACCO NON-USER: CPT | Performed by: PODIATRIST

## 2024-09-03 PROCEDURE — 99202 OFFICE O/P NEW SF 15 MIN: CPT | Performed by: PODIATRIST

## 2024-09-04 NOTE — PROGRESS NOTES
CC:  painful thickened and elongated toenails    HPI:  This pt. presents complaining painful thickened and elongated toenails that are difficult to manage.  Onset was gradual with worsening course until recently.  Aggravated by shoe gear and ambulation.       PCP: Elena Anderson CNP  Last visit: 24     PMH  Past Medical History:   Diagnosis Date    Body mass index (BMI) pediatric, 85th percentile to less than 95th percentile for age 2019    BMI (body mass index), pediatric, 85% to less than 95% for age    Hypermetropia, unspecified eye 2019    Hyperopia    Nausea 2020    Nausea in adult    Personal history of other diseases of the digestive system 2020    History of gastritis    Personal history of other diseases of the respiratory system 2020    History of acute sinusitis    Personal history of other specified conditions 2020    History of nausea and vomiting    Personal history of other specified conditions 2019    History of fatigue    Personal history of other specified conditions 2020    History of diarrhea    Personal history of other specified conditions 2019    History of diarrhea     , unspecified weeks of gestation ([facility])     Premature infant    Unspecified otitis externa, right ear 2017    Right otitis externa     MEDS    Current Outpatient Medications:     cholecalciferol (Vitamin D3) 50 mcg (2,000 unit) capsule, Take 1 capsule (50 mcg) by mouth once daily., Disp: 90 capsule, Rfl: 3    terbinafine (LamISIL) 250 mg tablet, Take 1 tablet (250 mg) by mouth once daily. (Patient not taking: Reported on 9/3/2024), Disp: 84 tablet, Rfl: 0  Allergies  Allergies   Allergen Reactions    Cephalexin Shortness of breath    Sulfamethoxazole-Trimethoprim Rash, Unknown and Itching     muscle pain    Pt states itching and chest started to hurt     Other Reaction(s): Unknown, Unknown      Pt states itching and chest started to hurt        muscle pain    Other Reaction(s): Unknown, Unknown      Pt states itching and chest started to hurt       muscle pain      Other Reaction(s): Unknown      muscle pain  Pt states itching and chest started to hurt   Other Reaction(s): Unknown, Unknown  Pt states itching and chest started to hurt   muscle pain     Social History     Socioeconomic History    Marital status: Single   Tobacco Use    Smoking status: Never    Smokeless tobacco: Never   Vaping Use    Vaping status: Never Used   Substance and Sexual Activity    Alcohol use: Never    Drug use: Never     Family History   Problem Relation Name Age of Onset    Diabetes Father       Past Surgical History:   Procedure Laterality Date    COLONOSCOPY  03/20/2018    Complete Colonoscopy    OTHER SURGICAL HISTORY  11/17/2015    Prior Surgical Procedure Not Done       REVIEW OF SYSTEMS    DERM:   + as noted in HPI.       Physical examination:   On General Observation: Patient is a pleasant, cooperative, well developed 23 y.o.  adult female. The patient is alert and oriented to time, place and person. Patient has normal affect and mood.  /60     Vascular:  DP and PT pulses are 2/4 b/l.  no edema noted. no varicosities b/l.  CFT  5 seconds to all digits bilateral.  Skin temperature is warm to warm from proximal to distal bilateral.      Muscular: Strength is 5/5 for all instrinsic and extrinsic muscle groups.     Neuro:  Proprioception present.   Sensation to vibration is  present. Protective sensation intact  at all pedal sites via [place] Nicole 5.07 monofilament bilateral.  Light touch present bilateral.     Derm:    Decreased hair growth b/l le  Left toenails: 2,3,4 Brittleness, crumbling upon debridement, subungual debris, elongation, mycotic appearance, tenderness, and thickness.         ASSESSMENT:    Tinea Unguium [B35.1]   Pain in left toe(s) [M79.675]       PLAN:   Consult  A comprehensive history and physical examination were preformed. The patient was  educated on clinical findings, diagnosis and treatment plans. Patient understands all that has been explained and all questions were answered to apparent satisfaction.   -Reviewed options with pt, sterile nail plate biopsy done, await results from the left 2nd toe. Then will decide on treatment options      Bam Khan DPM

## 2024-09-10 ENCOUNTER — APPOINTMENT (OUTPATIENT)
Dept: PODIATRY | Facility: CLINIC | Age: 24
End: 2024-09-10
Payer: COMMERCIAL

## 2024-09-10 LAB
LABORATORY COMMENT REPORT: NORMAL
PATH REPORT.FINAL DX SPEC: NORMAL
PATH REPORT.GROSS SPEC: NORMAL
PATH REPORT.RELEVANT HX SPEC: NORMAL
PATH REPORT.TOTAL CANCER: NORMAL

## 2024-09-16 ENCOUNTER — PATIENT MESSAGE (OUTPATIENT)
Dept: PODIATRY | Facility: CLINIC | Age: 24
End: 2024-09-16
Payer: COMMERCIAL

## 2024-09-16 DIAGNOSIS — B35.1 TINEA UNGUIUM: Primary | ICD-10-CM

## 2024-09-19 RX ORDER — CICLOPIROX 80 MG/ML
SOLUTION TOPICAL NIGHTLY
Qty: 6.6 ML | Refills: 3 | Status: SHIPPED | OUTPATIENT
Start: 2024-09-19

## 2024-10-28 ENCOUNTER — APPOINTMENT (OUTPATIENT)
Dept: PODIATRY | Facility: CLINIC | Age: 24
End: 2024-10-28
Payer: COMMERCIAL

## 2024-11-21 ENCOUNTER — APPOINTMENT (OUTPATIENT)
Dept: PODIATRY | Facility: CLINIC | Age: 24
End: 2024-11-21
Payer: COMMERCIAL

## 2024-11-21 DIAGNOSIS — B35.1 TINEA UNGUIUM: Primary | ICD-10-CM

## 2024-11-21 DIAGNOSIS — L60.3 NAIL DYSTROPHY: ICD-10-CM

## 2024-11-21 PROCEDURE — 1036F TOBACCO NON-USER: CPT | Performed by: PODIATRIST

## 2024-11-21 RX ORDER — CICLOPIROX 80 MG/ML
SOLUTION TOPICAL NIGHTLY
Qty: 6.6 ML | Refills: 3 | Status: SHIPPED | OUTPATIENT
Start: 2024-11-21

## 2024-11-21 NOTE — PROGRESS NOTES
CC:  painful thickened and elongated toenails     HPI:  This pt. Seen for laser left great toe.  PCP: Elena Anderson CNP  Last visit: 24      PMH  Medical History        Past Medical History:   Diagnosis Date    Body mass index (BMI) pediatric, 85th percentile to less than 95th percentile for age 2019     BMI (body mass index), pediatric, 85% to less than 95% for age    Hypermetropia, unspecified eye 2019     Hyperopia    Nausea 2020     Nausea in adult    Personal history of other diseases of the digestive system 2020     History of gastritis    Personal history of other diseases of the respiratory system 2020     History of acute sinusitis    Personal history of other specified conditions 2020     History of nausea and vomiting    Personal history of other specified conditions 2019     History of fatigue    Personal history of other specified conditions 2020     History of diarrhea    Personal history of other specified conditions 2019     History of diarrhea     , unspecified weeks of gestation ([facility])       Premature infant    Unspecified otitis externa, right ear 2017     Right otitis externa         MEDS    Current Medications      Current Outpatient Medications:     cholecalciferol (Vitamin D3) 50 mcg (2,000 unit) capsule, Take 1 capsule (50 mcg) by mouth once daily., Disp: 90 capsule, Rfl: 3    terbinafine (LamISIL) 250 mg tablet, Take 1 tablet (250 mg) by mouth once daily. (Patient not taking: Reported on 9/3/2024), Disp: 84 tablet, Rfl: 0     Allergies  Allergies         Allergies   Allergen Reactions    Cephalexin Shortness of breath    Sulfamethoxazole-Trimethoprim Rash, Unknown and Itching       muscle pain     Pt states itching and chest started to hurt      Other Reaction(s): Unknown, Unknown      Pt states itching and chest started to hurt       muscle pain     Other Reaction(s): Unknown, Unknown      Pt states itching and  chest started to hurt       muscle pain      Other Reaction(s): Unknown      muscle pain  Pt states itching and chest started to hurt   Other Reaction(s): Unknown, Unknown  Pt states itching and chest started to hurt   muscle pain         Social History   Social History           Socioeconomic History    Marital status: Single   Tobacco Use    Smoking status: Never    Smokeless tobacco: Never   Vaping Use    Vaping status: Never Used   Substance and Sexual Activity    Alcohol use: Never    Drug use: Never         Family History          Family History   Problem Relation Name Age of Onset    Diabetes Father             Surgical History         Past Surgical History:   Procedure Laterality Date    COLONOSCOPY   03/20/2018     Complete Colonoscopy    OTHER SURGICAL HISTORY   11/17/2015     Prior Surgical Procedure Not Done            REVIEW OF SYSTEMS     DERM:   + as noted in HPI.         Physical examination:   On General Observation: Patient is a pleasant, cooperative, well developed 23 y.o.  adult female. The patient is alert and oriented to time, place and person. Patient has normal affect and mood.  /60      Vascular:  DP and PT pulses are 2/4 b/l.  no edema noted. no varicosities b/l.  CFT  5 seconds to all digits bilateral.  Skin temperature is warm to warm from proximal to distal bilateral.       Muscular: Strength is 5/5 for all instrinsic and extrinsic muscle groups.      Neuro:  Proprioception present.   Sensation to vibration is  present. Protective sensation intact  at all pedal sites via [place] Nicole 5.07 monofilament bilateral.  Light touch present bilateral.      Derm:    Decreased hair growth b/l le  Left toenails: 1 Brittleness, crumbling upon debridement, subungual debris, elongation, mycotic appearance, tenderness, and thickness.            ASSESSMENT:    Nail Dystrophy  Pain in left toe(s) [M79.891]         PLAN:   Consent reviewed and signed by pt, reviewed risks, benefits, treatment  time and recurrence, no guarantees given, pre laser picture obtained, left hallux nail debrided, laser done, no complications, will refill the penlac, follow up 3 months.     Bam Khan DPM

## 2024-12-09 ENCOUNTER — APPOINTMENT (OUTPATIENT)
Dept: PRIMARY CARE | Facility: CLINIC | Age: 24
End: 2024-12-09
Payer: COMMERCIAL

## 2024-12-09 ENCOUNTER — LAB (OUTPATIENT)
Dept: LAB | Facility: LAB | Age: 24
End: 2024-12-09
Payer: COMMERCIAL

## 2024-12-09 VITALS
BODY MASS INDEX: 34.81 KG/M2 | HEIGHT: 61 IN | DIASTOLIC BLOOD PRESSURE: 76 MMHG | HEART RATE: 90 BPM | SYSTOLIC BLOOD PRESSURE: 112 MMHG | WEIGHT: 184.4 LBS | OXYGEN SATURATION: 100 %

## 2024-12-09 DIAGNOSIS — Z00.00 WELLNESS EXAMINATION: Primary | ICD-10-CM

## 2024-12-09 DIAGNOSIS — Z00.00 WELLNESS EXAMINATION: ICD-10-CM

## 2024-12-09 DIAGNOSIS — Z13.220 NEED FOR LIPID SCREENING: ICD-10-CM

## 2024-12-09 DIAGNOSIS — T14.8XXA BRUISING: ICD-10-CM

## 2024-12-09 DIAGNOSIS — Z82.49 FAMILY HISTORY OF CARDIOMYOPATHY: ICD-10-CM

## 2024-12-09 DIAGNOSIS — Z13.29 THYROID DISORDER SCREEN: ICD-10-CM

## 2024-12-09 PROBLEM — S39.011A ABDOMINAL WALL STRAIN: Status: ACTIVE | Noted: 2024-09-27

## 2024-12-09 PROCEDURE — 80053 COMPREHEN METABOLIC PANEL: CPT

## 2024-12-09 PROCEDURE — 85610 PROTHROMBIN TIME: CPT

## 2024-12-09 PROCEDURE — 36415 COLL VENOUS BLD VENIPUNCTURE: CPT

## 2024-12-09 PROCEDURE — 80061 LIPID PANEL: CPT

## 2024-12-09 PROCEDURE — 85652 RBC SED RATE AUTOMATED: CPT

## 2024-12-09 PROCEDURE — 82607 VITAMIN B-12: CPT

## 2024-12-09 PROCEDURE — 3008F BODY MASS INDEX DOCD: CPT | Performed by: NURSE PRACTITIONER

## 2024-12-09 PROCEDURE — 85730 THROMBOPLASTIN TIME PARTIAL: CPT

## 2024-12-09 PROCEDURE — 83550 IRON BINDING TEST: CPT

## 2024-12-09 PROCEDURE — 83540 ASSAY OF IRON: CPT

## 2024-12-09 PROCEDURE — 85025 COMPLETE CBC W/AUTO DIFF WBC: CPT

## 2024-12-09 PROCEDURE — 84443 ASSAY THYROID STIM HORMONE: CPT

## 2024-12-09 PROCEDURE — 99395 PREV VISIT EST AGE 18-39: CPT | Performed by: NURSE PRACTITIONER

## 2024-12-09 PROCEDURE — 82728 ASSAY OF FERRITIN: CPT

## 2024-12-09 PROCEDURE — 1036F TOBACCO NON-USER: CPT | Performed by: NURSE PRACTITIONER

## 2024-12-09 PROCEDURE — 82652 VIT D 1 25-DIHYDROXY: CPT

## 2024-12-09 RX ORDER — FLUTICASONE FUROATE 27.5 UG/1
2 SPRAY, METERED NASAL
COMMUNITY

## 2024-12-09 ASSESSMENT — ENCOUNTER SYMPTOMS
NERVOUS/ANXIOUS: 0
CONSTITUTIONAL NEGATIVE: 1
ENDOCRINE NEGATIVE: 1
DYSPHORIC MOOD: 0
GASTROINTESTINAL NEGATIVE: 1
RESPIRATORY NEGATIVE: 1
NEUROLOGICAL NEGATIVE: 1
BRUISES/BLEEDS EASILY: 1
SLEEP DISTURBANCE: 0
MUSCULOSKELETAL NEGATIVE: 1
CARDIOVASCULAR NEGATIVE: 1

## 2024-12-09 NOTE — PROGRESS NOTES
"Subjective   Patient ID: Suman Gerber is a 24 y.o. female who presents for Annual Exam (Yearly).    HPI   Patient here for routine follow up. Last office vist 07/10/2024.  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   Current concerns:  1) family hx father dx cardiomyopathy age 48, told it was genetic.. Patient is interested in cardiac testing.   2) easy bruising lower legs. Denies change in diet, supplements, use of otc NSAIDs etc. Denies bloody nose, blood in BM, or bruising on arms, trunk, back. .  Chronic concerns: HSV 1,  Low iron stores, IBS,   Specialist  - podiatrist Dr Khan  Labs 06/25/2024  Walking for exercise   Diet- home cooked healthy foods- fruits and vegetables and protein   Hydration- water, electrolytes.   No alcohol.     Review of Systems   Constitutional: Negative.    HENT: Negative.          DDS over a year ago.    Eyes:         Eye exam in the last month    Respiratory: Negative.     Cardiovascular: Negative.    Gastrointestinal: Negative.    Endocrine: Negative.    Genitourinary: Negative.         LMP currently, monthly- first few days heavy. Cramping not bad.    Musculoskeletal: Negative.    Skin: Negative.    Allergic/Immunologic: Positive for environmental allergies.   Neurological: Negative.         Bruising ln bilateral legs.    Hematological:  Bruises/bleeds easily (few weeks of bruising in legs).   Psychiatric/Behavioral:  Negative for dysphoric mood and sleep disturbance (six hours of sleep typically.). The patient is not nervous/anxious.        Objective   /76 (BP Location: Right arm, Patient Position: Sitting, BP Cuff Size: Adult)   Pulse 90   Ht 1.549 m (5' 1\")   Wt 83.6 kg (184 lb 6.4 oz)   SpO2 100%   BMI 34.84 kg/m²   Weight in June 2024 174.6 lbs   Physical Exam  Vitals reviewed.   HENT:      Right Ear: Tympanic membrane and ear canal normal.      Left Ear: Tympanic membrane and ear canal normal.      Nose: Nose normal.      Mouth/Throat:      Lips: Pink.      Mouth: " Mucous membranes are moist.      Pharynx: Oropharynx is clear.   Eyes:      General: Lids are normal.      Extraocular Movements: Extraocular movements intact.      Conjunctiva/sclera: Conjunctivae normal.      Pupils: Pupils are equal, round, and reactive to light.   Neck:      Thyroid: No thyromegaly.      Vascular: No carotid bruit.   Cardiovascular:      Rate and Rhythm: Normal rate and regular rhythm.      Pulses:           Dorsalis pedis pulses are 2+ on the right side and 2+ on the left side.      Heart sounds: Normal heart sounds.   Pulmonary:      Effort: Pulmonary effort is normal.      Breath sounds: Normal breath sounds. No decreased breath sounds, wheezing, rhonchi or rales.   Abdominal:      General: Bowel sounds are normal.      Palpations: Abdomen is soft.      Tenderness: There is no abdominal tenderness.   Musculoskeletal:      Cervical back: Neck supple.      Right lower leg: No edema.      Left lower leg: No edema.   Lymphadenopathy:      Cervical: No cervical adenopathy.   Skin:     Findings: Bruising (outer left calf  quarter size older apperaing bruise and right lateral ankle half-dollar size faded dark blue bruise.) present. No rash.   Neurological:      General: No focal deficit present.      Mental Status: She is alert.      Cranial Nerves: Cranial nerves 2-12 are intact.      Motor: Motor function is intact.      Gait: Gait is intact.      Deep Tendon Reflexes:      Reflex Scores:       Bicep reflexes are 2+ on the right side and 2+ on the left side.       Patellar reflexes are 2+ on the right side and 2+ on the left side.  Psychiatric:         Attention and Perception: Attention normal.         Behavior: Behavior normal. Behavior is cooperative.       Assessment/Plan   Health Maintenance  Labs- 06/25/2024  Tdap/ Td-   02/02/2021  Influenza- received   Prevnar 13/20 - not age indicated   Shingrix- not age indicated   Colonoscopy- not age indicated no family hx age 45   Cervical Cancer  screen - referral to GYN   Mammogram-  age 39   DEXA BONE Density - not age indicated   Diagnoses and all orders for this visit:  Wellness examination  reviewed screenings, immunizations and vital signs. Reviewed appropriate health screenings/practices: including regular DDS & eye exams, wearing sunscreen,  appropriate balanced diet, such as the Mediterranean diet or low fat heart healthy diet,  exercise - moderate activity of at least 150 minutes a week, obtain and maintain normal Body Mass Index.         -     Referral to Gynecology; Future  [facility]   -     CBC and Auto Differential; Future  -     Comprehensive Metabolic Panel; Future  Family history of cardiomyopathy  -     Referral to Cardiology; Future Dr Harden   -     CBC and Auto Differential; Future  Bruising  -     CBC and Auto Differential; Future  -     Coagulation Screen; Future  -     Ferritin; Future  -     Iron and TIBC; Future  -     Sedimentation Rate; Future  -     Vitamin B12; Future  -     Vitamin D 1,25 Dihydroxy (for eval of hypercalcemia); Future  BMI 34.0-34.9,adult  Patient requesting medication for weight loss, discussed at length that such medications are inappropriate for long term weight management   -     Referral to Nutrition Services; Future   Diana Wallis RD,RAJ   -     TSH with reflex to Free T4 if abnormal; Future  Need for lipid screening  -     Lipid Panel; Future  Thyroid disorder screen          -     TSH with reflex to Free T4 if abnormal; Future     PLAN: Follow up yearly as wellness and pending any concerning labs.    RLab orders for increased bruising. Please complete today   Lipid screening lab also ordered, wait until fasting 10- 12 hours and complete.   eferral to Dietitian,  GYN and Cardiology

## 2024-12-09 NOTE — PATIENT INSTRUCTIONS
Referral to Dietitian,  GYN and Cardiology    Lab orders for increased bruising. Please complete today   Lipid screening lab also ordered, wait until fasting 10- 12 hours and complete.

## 2024-12-10 LAB
ALBUMIN SERPL BCP-MCNC: 4.4 G/DL (ref 3.4–5)
ALP SERPL-CCNC: 54 U/L (ref 33–110)
ALT SERPL W P-5'-P-CCNC: 10 U/L (ref 7–45)
ANION GAP SERPL CALC-SCNC: 14 MMOL/L (ref 10–20)
APTT PPP: 30 SECONDS (ref 27–38)
AST SERPL W P-5'-P-CCNC: 16 U/L (ref 9–39)
BASOPHILS # BLD AUTO: 0.03 X10*3/UL (ref 0–0.1)
BASOPHILS NFR BLD AUTO: 0.6 %
BILIRUB SERPL-MCNC: 0.3 MG/DL (ref 0–1.2)
BUN SERPL-MCNC: 12 MG/DL (ref 6–23)
CALCIUM SERPL-MCNC: 9.3 MG/DL (ref 8.6–10.6)
CHLORIDE SERPL-SCNC: 105 MMOL/L (ref 98–107)
CHOLEST SERPL-MCNC: 175 MG/DL (ref 0–199)
CHOLESTEROL/HDL RATIO: 2.7
CO2 SERPL-SCNC: 25 MMOL/L (ref 21–32)
CREAT SERPL-MCNC: 0.73 MG/DL (ref 0.5–1.05)
EGFRCR SERPLBLD CKD-EPI 2021: >90 ML/MIN/1.73M*2
EOSINOPHIL # BLD AUTO: 0.07 X10*3/UL (ref 0–0.7)
EOSINOPHIL NFR BLD AUTO: 1.4 %
ERYTHROCYTE [DISTWIDTH] IN BLOOD BY AUTOMATED COUNT: 13.5 % (ref 11.5–14.5)
ERYTHROCYTE [SEDIMENTATION RATE] IN BLOOD BY WESTERGREN METHOD: 5 MM/H (ref 0–20)
FERRITIN SERPL-MCNC: 17 NG/ML (ref 8–150)
GLUCOSE SERPL-MCNC: 86 MG/DL (ref 74–99)
HCT VFR BLD AUTO: 39.1 % (ref 36–46)
HDLC SERPL-MCNC: 65.5 MG/DL
HGB BLD-MCNC: 12.5 G/DL (ref 12–16)
IMM GRANULOCYTES # BLD AUTO: 0.03 X10*3/UL (ref 0–0.7)
IMM GRANULOCYTES NFR BLD AUTO: 0.6 % (ref 0–0.9)
INR PPP: 0.9 (ref 0.9–1.1)
IRON SATN MFR SERPL: 6 % (ref 25–45)
IRON SERPL-MCNC: 24 UG/DL (ref 35–150)
LDLC SERPL CALC-MCNC: 89 MG/DL
LYMPHOCYTES # BLD AUTO: 2.07 X10*3/UL (ref 1.2–4.8)
LYMPHOCYTES NFR BLD AUTO: 41.6 %
MCH RBC QN AUTO: 27.7 PG (ref 26–34)
MCHC RBC AUTO-ENTMCNC: 32 G/DL (ref 32–36)
MCV RBC AUTO: 87 FL (ref 80–100)
MONOCYTES # BLD AUTO: 0.4 X10*3/UL (ref 0.1–1)
MONOCYTES NFR BLD AUTO: 8 %
NEUTROPHILS # BLD AUTO: 2.37 X10*3/UL (ref 1.2–7.7)
NEUTROPHILS NFR BLD AUTO: 47.8 %
NON HDL CHOLESTEROL: 110 MG/DL (ref 0–149)
NRBC BLD-RTO: 0 /100 WBCS (ref 0–0)
PLATELET # BLD AUTO: 279 X10*3/UL (ref 150–450)
POTASSIUM SERPL-SCNC: 3.9 MMOL/L (ref 3.5–5.3)
PROT SERPL-MCNC: 7 G/DL (ref 6.4–8.2)
PROTHROMBIN TIME: 10 SECONDS (ref 9.8–12.8)
RBC # BLD AUTO: 4.52 X10*6/UL (ref 4–5.2)
SODIUM SERPL-SCNC: 140 MMOL/L (ref 136–145)
TIBC SERPL-MCNC: 433 UG/DL (ref 240–445)
TRIGL SERPL-MCNC: 102 MG/DL (ref 0–149)
TSH SERPL-ACNC: 2.62 MIU/L (ref 0.44–3.98)
UIBC SERPL-MCNC: 409 UG/DL (ref 110–370)
VIT B12 SERPL-MCNC: 296 PG/ML (ref 211–911)
VLDL: 20 MG/DL (ref 0–40)
WBC # BLD AUTO: 5 X10*3/UL (ref 4.4–11.3)

## 2024-12-11 LAB — 1,25(OH)2D SERPL-MCNC: 44.5 PG/ML (ref 19.9–79.3)

## 2025-02-24 ENCOUNTER — APPOINTMENT (OUTPATIENT)
Dept: PODIATRY | Facility: CLINIC | Age: 25
End: 2025-02-24
Payer: COMMERCIAL

## 2025-03-25 ENCOUNTER — APPOINTMENT (OUTPATIENT)
Dept: PODIATRY | Facility: CLINIC | Age: 25
End: 2025-03-25
Payer: COMMERCIAL

## 2025-03-25 DIAGNOSIS — L60.3 NAIL DYSTROPHY: Primary | ICD-10-CM

## 2025-03-25 PROCEDURE — 99212 OFFICE O/P EST SF 10 MIN: CPT | Performed by: PODIATRIST

## 2025-03-25 PROCEDURE — 1036F TOBACCO NON-USER: CPT | Performed by: PODIATRIST

## 2025-03-25 RX ORDER — CICLOPIROX 80 MG/ML
SOLUTION TOPICAL NIGHTLY
Qty: 6.6 ML | Refills: 3 | Status: SHIPPED | OUTPATIENT
Start: 2025-03-25

## 2025-03-25 NOTE — PROGRESS NOTES
CC:  painful thickened and elongated toenails     HPI:  Pt seen follow up nail fungus left hallux, had the laser 6 months ago, using the formula 3.    PCP: Elena Anderson CNP  Last visit: 23     PMH  Medical History           Past Medical History:   Diagnosis Date    Body mass index (BMI) pediatric, 85th percentile to less than 95th percentile for age 2019     BMI (body mass index), pediatric, 85% to less than 95% for age    Hypermetropia, unspecified eye 2019     Hyperopia    Nausea 2020     Nausea in adult    Personal history of other diseases of the digestive system 2020     History of gastritis    Personal history of other diseases of the respiratory system 2020     History of acute sinusitis    Personal history of other specified conditions 2020     History of nausea and vomiting    Personal history of other specified conditions 2019     History of fatigue    Personal history of other specified conditions 2020     History of diarrhea    Personal history of other specified conditions 2019     History of diarrhea     , unspecified weeks of gestation ([facility])       Premature infant    Unspecified otitis externa, right ear 2017     Right otitis externa         MEDS     Current Medications      Current Outpatient Medications:     cholecalciferol (Vitamin D3) 50 mcg (2,000 unit) capsule, Take 1 capsule (50 mcg) by mouth once daily., Disp: 90 capsule, Rfl: 3    terbinafine (LamISIL) 250 mg tablet, Take 1 tablet (250 mg) by mouth once daily. (Patient not taking: Reported on 9/3/2024), Disp: 84 tablet, Rfl: 0      Allergies  Allergies             Allergies   Allergen Reactions    Cephalexin Shortness of breath    Sulfamethoxazole-Trimethoprim Rash, Unknown and Itching       muscle pain     Pt states itching and chest started to hurt      Other Reaction(s): Unknown, Unknown      Pt states itching and chest started to hurt       muscle pain      Other Reaction(s): Unknown, Unknown      Pt states itching and chest started to hurt       muscle pain      Other Reaction(s): Unknown      muscle pain  Pt states itching and chest started to hurt   Other Reaction(s): Unknown, Unknown  Pt states itching and chest started to hurt   muscle pain         Social History   Social History              Socioeconomic History    Marital status: Single   Tobacco Use    Smoking status: Never    Smokeless tobacco: Never   Vaping Use    Vaping status: Never Used   Substance and Sexual Activity    Alcohol use: Never    Drug use: Never         Family History               Family History   Problem Relation Name Age of Onset    Diabetes Father             Surgical History             Past Surgical History:   Procedure Laterality Date    COLONOSCOPY   03/20/2018     Complete Colonoscopy    OTHER SURGICAL HISTORY   11/17/2015     Prior Surgical Procedure Not Done            REVIEW OF SYSTEMS     DERM:   + as noted in HPI.         Physical examination:   On General Observation: Patient is a pleasant, cooperative, well developed 23 y.o.  adult female. The patient is alert and oriented to time, place and person. Patient has normal affect and mood.  /60      Vascular:  DP and PT pulses are 2/4 b/l.  no edema noted. no varicosities b/l.  CFT  5 seconds to all digits bilateral.  Skin temperature is warm to warm from proximal to distal bilateral.       Muscular: Strength is 5/5 for all instrinsic and extrinsic muscle groups.      Neuro:  Proprioception present.   Sensation to vibration is  present. Protective sensation intact  at all pedal sites via [place] Nicole 5.07 monofilament bilateral.  Light touch present bilateral.      Derm:    Decreased hair growth b/l le  Left toenails: 1 Brittleness, crumbling upon debridement, subungual debris, elongation, mycotic appearance, tenderness, and thickness.   50% of the left hallux nail growing out.        ASSESSMENT:    Nail  Dystrophy  Pain in left toe(s) [C63.023]         PLAN:   Debrided the hallux nail in length and height  Will follow up laser retouch, will refill the penlac.     Bam Khan DPM

## 2025-04-11 ENCOUNTER — APPOINTMENT (OUTPATIENT)
Dept: PODIATRY | Facility: CLINIC | Age: 25
End: 2025-04-11
Payer: COMMERCIAL

## 2025-07-28 ENCOUNTER — PATIENT OUTREACH (OUTPATIENT)
Dept: CARE COORDINATION | Facility: CLINIC | Age: 25
End: 2025-07-28
Payer: COMMERCIAL

## 2025-07-28 NOTE — PROGRESS NOTES
Referral received for NUTR ed. Attempted to reach pt at listed number. Voice message was left with contact information for return call.     Thank you,  Genie Cervantes M.Ed, RDN, LD, [facility]  Registered Dietitian, Certified Diabetes Care and    [phone]

## 2025-07-29 ENCOUNTER — PATIENT OUTREACH (OUTPATIENT)
Dept: CARE COORDINATION | Facility: CLINIC | Age: 25
End: 2025-07-29
Payer: COMMERCIAL

## 2025-07-29 NOTE — PROGRESS NOTES
Referral received for NUTR ed. Pt had interest in wt loss medication. Will talk with provider to see if she needs a NUTR appointment first.     Thank you,  Genie Cervantes M.Ed, RDN, LD, [facility]  Registered Dietitian, Certified Diabetes Care and    [phone]

## 2025-08-11 ENCOUNTER — PATIENT OUTREACH (OUTPATIENT)
Dept: CARE COORDINATION | Facility: CLINIC | Age: 25
End: 2025-08-11
Payer: COMMERCIAL

## 2025-08-21 PROBLEM — J33.9 CHRONIC RHINOSINUSITIS WITH MULTIPLE NASAL POLYPS: Status: ACTIVE | Noted: 2025-08-07

## 2025-08-21 PROBLEM — J32.9 CHRONIC RHINOSINUSITIS WITH MULTIPLE NASAL POLYPS: Status: ACTIVE | Noted: 2025-08-07

## 2025-08-22 ENCOUNTER — OFFICE VISIT (OUTPATIENT)
Dept: PRIMARY CARE | Facility: CLINIC | Age: 25
End: 2025-08-22
Payer: COMMERCIAL

## 2025-08-22 VITALS
BODY MASS INDEX: 33.85 KG/M2 | WEIGHT: 179.3 LBS | TEMPERATURE: 98.4 F | SYSTOLIC BLOOD PRESSURE: 90 MMHG | OXYGEN SATURATION: 99 % | DIASTOLIC BLOOD PRESSURE: 63 MMHG | HEART RATE: 89 BPM | HEIGHT: 61 IN

## 2025-08-22 DIAGNOSIS — R53.83 OTHER FATIGUE: ICD-10-CM

## 2025-08-22 DIAGNOSIS — E87.6 HYPOKALEMIA: ICD-10-CM

## 2025-08-22 DIAGNOSIS — E61.1 LOW IRON: Primary | ICD-10-CM

## 2025-08-22 DIAGNOSIS — M54.2 NECK PAIN: ICD-10-CM

## 2025-08-22 DIAGNOSIS — R79.89 LFT ELEVATION: ICD-10-CM

## 2025-08-22 DIAGNOSIS — Z23 NEED FOR IMMUNIZATION AGAINST INFLUENZA: ICD-10-CM

## 2025-08-22 PROBLEM — E16.2 HYPOGLYCEMIA: Status: ACTIVE | Noted: 2025-01-26

## 2025-08-22 PROBLEM — J34.3 HYPERTROPHY OF INFERIOR NASAL TURBINATE: Status: ACTIVE | Noted: 2025-08-07

## 2025-08-22 PROCEDURE — 90471 IMMUNIZATION ADMIN: CPT | Performed by: INTERNAL MEDICINE

## 2025-08-22 PROCEDURE — 1036F TOBACCO NON-USER: CPT | Performed by: INTERNAL MEDICINE

## 2025-08-22 PROCEDURE — 3008F BODY MASS INDEX DOCD: CPT | Performed by: INTERNAL MEDICINE

## 2025-08-22 PROCEDURE — 99214 OFFICE O/P EST MOD 30 MIN: CPT | Performed by: INTERNAL MEDICINE

## 2025-08-22 PROCEDURE — 90656 IIV3 VACC NO PRSV 0.5 ML IM: CPT | Performed by: INTERNAL MEDICINE

## 2025-08-22 RX ORDER — FLUTICASONE PROPIONATE 50 MCG
SPRAY, SUSPENSION (ML) NASAL
COMMUNITY
Start: 2025-06-10

## 2025-08-22 RX ORDER — AZELASTINE 1 MG/ML
SPRAY, METERED NASAL
COMMUNITY
Start: 2025-06-10

## 2025-08-22 RX ORDER — MECLIZINE HYDROCHLORIDE 25 MG/1
25 TABLET ORAL 3 TIMES DAILY PRN
COMMUNITY
Start: 2025-04-30

## 2025-08-22 RX ORDER — CYCLOBENZAPRINE HCL 5 MG
5 TABLET ORAL NIGHTLY PRN
Qty: 30 TABLET | Refills: 0 | Status: SHIPPED | OUTPATIENT
Start: 2025-08-22 | End: 2025-10-21

## 2025-08-22 ASSESSMENT — ENCOUNTER SYMPTOMS
LIGHT-HEADEDNESS: 1
BACK PAIN: 0
NUMBNESS: 0
EYE DISCHARGE: 0
NAUSEA: 0
WOUND: 0
SORE THROAT: 0
VOMITING: 0
DYSURIA: 0
NERVOUS/ANXIOUS: 0
SLEEP DISTURBANCE: 0
DIZZINESS: 0
FEVER: 0
CONFUSION: 0
SEIZURES: 0
SHORTNESS OF BREATH: 0
UNEXPECTED WEIGHT CHANGE: 0
FATIGUE: 1
EYE PAIN: 0
PALPITATIONS: 0
WHEEZING: 0
NECK PAIN: 1
HEADACHES: 0
FREQUENCY: 0
WEAKNESS: 0
CHILLS: 0
FLANK PAIN: 0
BLOOD IN STOOL: 0
JOINT SWELLING: 0
COUGH: 0
TROUBLE SWALLOWING: 0
HEMATURIA: 0
ABDOMINAL PAIN: 0
CONSTIPATION: 0
DIARRHEA: 0
APPETITE CHANGE: 0
TREMORS: 0

## 2025-08-22 ASSESSMENT — PATIENT HEALTH QUESTIONNAIRE - PHQ9
2. FEELING DOWN, DEPRESSED OR HOPELESS: NOT AT ALL
SUM OF ALL RESPONSES TO PHQ9 QUESTIONS 1 AND 2: 0
1. LITTLE INTEREST OR PLEASURE IN DOING THINGS: NOT AT ALL

## 2025-08-29 LAB
NON-UH HIE A/G RATIO: 1.2
NON-UH HIE ALB: 4.2 G/DL (ref 3.4–5)
NON-UH HIE ALK PHOS: 55 UNIT/L (ref 45–117)
NON-UH HIE BASO COUNT: 0.03 X1000 (ref 0–0.2)
NON-UH HIE BASOS %: 0.6 %
NON-UH HIE BILIRUBIN, TOTAL: 0.7 MG/DL (ref 0.3–1.2)
NON-UH HIE BUN/CREAT RATIO: 12.5
NON-UH HIE BUN: 10 MG/DL (ref 9–23)
NON-UH HIE CALCIUM: 9.6 MG/DL (ref 8.7–10.4)
NON-UH HIE CALCULATED OSMOLALITY: 277 MOSM/KG (ref 275–295)
NON-UH HIE CHLORIDE: 104 MMOL/L (ref 98–107)
NON-UH HIE CO2, VENOUS: 27 MMOL/L (ref 20–31)
NON-UH HIE CREATININE: 0.8 MG/DL (ref 0.5–0.8)
NON-UH HIE DIFF?: ABNORMAL
NON-UH HIE EOS COUNT: 0.09 X1000 (ref 0–0.5)
NON-UH HIE EOSIN %: 2.1 %
NON-UH HIE GFR AA: >60
NON-UH HIE GLOBULIN: 3.4 G/DL
NON-UH HIE GLOMERULAR FILTRATION RATE: >60 ML/MIN/1.73M?
NON-UH HIE GLUCOSE: 75 MG/DL (ref 74–106)
NON-UH HIE GOT: 20 UNIT/L (ref 15–37)
NON-UH HIE GPT: 13 UNIT/L (ref 10–49)
NON-UH HIE HCT: 38.9 % (ref 36–46)
NON-UH HIE HGB: 12.7 G/DL (ref 12–16)
NON-UH HIE INSTR WBC: 4.3
NON-UH HIE IRON: 96 UG/DL (ref 50–170)
NON-UH HIE K: 3.2 MMOL/L (ref 3.5–5.1)
NON-UH HIE LYMPH %: 42.5 %
NON-UH HIE LYMPH COUNT: 1.85 X1000 (ref 1.2–4.8)
NON-UH HIE MCH: 27.3 PG (ref 27–34)
NON-UH HIE MCHC: 32.7 G/DL (ref 32–37)
NON-UH HIE MCV: 83.7 FL (ref 80–100)
NON-UH HIE MONO %: 8.9 %
NON-UH HIE MONO COUNT: 0.38 X1000 (ref 0.1–1)
NON-UH HIE MPV: 9.7 FL (ref 7.4–10.4)
NON-UH HIE NA: 140 MMOL/L (ref 135–145)
NON-UH HIE NEUTROPHIL %: 45.9 %
NON-UH HIE NEUTROPHIL COUNT (ANC): 1.99 X1000 (ref 1.4–8.8)
NON-UH HIE NUCLEATED RBC: 0 /100WBC
NON-UH HIE PLATELET: 256 X10 (ref 150–450)
NON-UH HIE RBC: 4.65 X10 (ref 4.2–5.4)
NON-UH HIE RDW: 14.4 % (ref 11.5–14.5)
NON-UH HIE SATURATION: 22.7 % (ref 20–50)
NON-UH HIE TIBC: 423 UG/ML (ref 250–425)
NON-UH HIE TOTAL PROTEIN: 7.6 G/DL (ref 5.7–8.2)
NON-UH HIE WBC: 4.3 X10 (ref 4.5–11)

## 2025-09-02 ENCOUNTER — RESULTS FOLLOW-UP (OUTPATIENT)
Dept: PRIMARY CARE | Facility: CLINIC | Age: 25
End: 2025-09-02
Payer: COMMERCIAL

## 2025-09-02 DIAGNOSIS — E87.6 HYPOKALEMIA: Primary | ICD-10-CM

## 2025-09-02 RX ORDER — POTASSIUM CHLORIDE 20 MEQ/1
20 TABLET, EXTENDED RELEASE ORAL 2 TIMES DAILY
Qty: 60 TABLET | Refills: 0 | Status: SHIPPED | OUTPATIENT
Start: 2025-09-02 | End: 2026-09-02

## 2025-12-11 ENCOUNTER — APPOINTMENT (OUTPATIENT)
Dept: PRIMARY CARE | Facility: CLINIC | Age: 25
End: 2025-12-11
Payer: COMMERCIAL